# Patient Record
Sex: MALE | Race: WHITE
[De-identification: names, ages, dates, MRNs, and addresses within clinical notes are randomized per-mention and may not be internally consistent; named-entity substitution may affect disease eponyms.]

---

## 2018-05-11 ENCOUNTER — HOSPITAL ENCOUNTER (EMERGENCY)
Dept: HOSPITAL 25 - UCEAST | Age: 73
Discharge: HOME | End: 2018-05-11
Payer: MEDICARE

## 2018-05-11 VITALS — SYSTOLIC BLOOD PRESSURE: 115 MMHG | DIASTOLIC BLOOD PRESSURE: 63 MMHG

## 2018-05-11 DIAGNOSIS — J18.9: Primary | ICD-10-CM

## 2018-05-11 PROCEDURE — 99212 OFFICE O/P EST SF 10 MIN: CPT

## 2018-05-11 PROCEDURE — G0463 HOSPITAL OUTPT CLINIC VISIT: HCPCS

## 2018-05-11 NOTE — UC
Respiratory Complaint HPI





- HPI Summary


HPI Summary: 


28 yo WM c/o cough with increasing and worsening cough with sputum, pleuritic 

CP that started out with a URI more than 1 week ago, denies f/c








- History of Current Complaint


Chief Complaint: UCRespiratory


Stated Complaint: URI


Time Seen by Provider: 05/11/18 15:35


Hx Obtained From: Patient


Onset/Duration: Lasting Days


Severity Initially: Moderate


Severity Currently: Moderate


Pain Intensity: 3


Pain Scale Used: 0-10 Numeric


Character: Cough: Productive


Associated Signs And Symptoms: Positive: Pleuritic Chest Pain, Wheezing.  

Negative: Fever, Chills





- Allergies/Home Medications


Allergies/Adverse Reactions: 


 Allergies











Allergy/AdvReac Type Severity Reaction Status Date / Time


 


No Known Allergies Allergy   Verified 05/11/18 15:46











Home Medications: 


 Home Medications





Albuterol inh POWDER (NF) [Proair Respiclick] 1 puff INH Q4H PRN 05/11/18 [

History Confirmed 05/11/18]











PMH/Surg Hx/FS Hx/Imm Hx


Previously Healthy: No


Cardiovascular History: Cardiac Disease





- Surgical History


Surgical History: Yes


Surgery Procedure, Year, and Place: RIGHT HAND FINGER SURGERY  [facility] 

REMOVAL 20 YRS AGO [facility]





- Social History


Alcohol Use: None


Substance Use Type: None


Smoking Status (MU): Never Smoked Tobacco


Have You Smoked in the Last Year: No





Review of Systems


Constitutional: Negative


Skin: Negative


Eyes: Negative


ENT: Negative


Respiratory: Cough - with productive sputum


Cardiovascular: Negative


Gastrointestinal: Negative


Genitourinary: Negative


Motor: Negative


Neurovascular: Negative


Musculoskeletal: Negative


Neurological: Negative


Psychological: Negative


All Other Systems Reviewed And Are Negative: Yes





Physical Exam


Triage Information Reviewed: Yes


Appearance: Ill-Appearing


Vital Signs: 


 Initial Vital Signs











Temp  36.3 C   05/11/18 15:36


 


Pulse  73   05/11/18 15:36


 


Resp  18   05/11/18 15:36


 


BP  115/63   05/11/18 15:36


 


Pulse Ox  97   05/11/18 15:36











Eye Exam: Normal


ENT Exam: Normal


Dental Exam: Normal


Neck exam: Normal


Neck: Positive: 1


Respiratory: Positive: No respiratory distress, No accessory muscle use, 

Respiratory distress, Rhonchi, Wheezing - inspiratory


Cardiovascular Exam: Normal


Cardiovascular: Positive: RRR


Abdominal Exam: Normal


Musculoskeletal Exam: Normal


Neurological Exam: Normal


Psychological Exam: Normal


Skin Exam: Normal





[facility] Diagnostic Evaluation





- Laboratory


O2 Sat by Pulse Oximetry: 97





Respiratory Course/Dx





- Course


Course Of Treatment: Pt clinically has PNA - diffuse rhonchi and wheezing- tx 

with PO levaquin 750 po qd x 7 days





- Differential Dx/Diagnosis


Provider Diagnoses: PNA





Discharge





- Sign-Out/Discharge


Documenting (check all that apply): Discharge/Admit/Transfer





- Discharge Plan


Condition: Stable


Disposition: HOME


Prescriptions: 


Levofloxacin TAB* [Levaquin TAB*] 750 mg PO DAILY 7 Days #7 tab


Patient Education Materials:  Pneumonia (ED)


Referrals: 


Ricky Ochoa MD [Primary Care Provider] - 


Additional Instructions: 


PLEASE GET MUCINEX DM OVER THE COUNTER AND TAKE IT TWICE A DAY EVERY 12 HRS





- Billing Disposition and Condition


Condition: STABLE


Disposition: HOME